# Patient Record
Sex: MALE | Race: BLACK OR AFRICAN AMERICAN | NOT HISPANIC OR LATINO | URBAN - NONMETROPOLITAN AREA
[De-identification: names, ages, dates, MRNs, and addresses within clinical notes are randomized per-mention and may not be internally consistent; named-entity substitution may affect disease eponyms.]

---
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
## 2022-04-15 ASSESSMENT — VISUAL ACUITY
OD_CC: 20/200
OD_PH: 20/100
OS_PH: 20/100
OS_CC: 20/200

## 2022-04-15 ASSESSMENT — TONOMETRY
OD_IOP_MMHG: 16
OS_IOP_MMHG: 16

## 2022-05-20 NOTE — PATIENT DISCUSSION
Suspect due to cupping asymmetry.  Progression analysis stable back to 2018.  RNFL is robust  / .  Repeat ONH OCT 2024 and assess for changes.

## 2022-05-20 NOTE — PATIENT DISCUSSION
Used loose lenses over phoropter to show him increased cyl and it did improve his monocular vision but OU there was little difference if not counterproductive.  So we dabbled with an rx change but ultimately kept it the same due to lack of improvement.